# Patient Record
Sex: MALE | Race: ASIAN | NOT HISPANIC OR LATINO | URBAN - METROPOLITAN AREA
[De-identification: names, ages, dates, MRNs, and addresses within clinical notes are randomized per-mention and may not be internally consistent; named-entity substitution may affect disease eponyms.]

---

## 2017-10-26 ENCOUNTER — EMERGENCY (EMERGENCY)
Facility: HOSPITAL | Age: 47
LOS: 1 days | Discharge: HOME | End: 2017-10-26
Attending: EMERGENCY MEDICINE | Admitting: EMERGENCY MEDICINE
Payer: MEDICAID

## 2017-10-26 VITALS
OXYGEN SATURATION: 100 % | DIASTOLIC BLOOD PRESSURE: 95 MMHG | HEART RATE: 86 BPM | TEMPERATURE: 98 F | RESPIRATION RATE: 16 BRPM | SYSTOLIC BLOOD PRESSURE: 137 MMHG

## 2017-10-26 PROCEDURE — 99284 EMERGENCY DEPT VISIT MOD MDM: CPT | Mod: 25

## 2017-10-27 LAB
HIV1 AG SER QL: SIGNIFICANT CHANGE UP
HIV1+2 AB SPEC QL: SIGNIFICANT CHANGE UP

## 2017-10-27 RX ORDER — DIAZEPAM 5 MG
1 TABLET ORAL
Qty: 9 | Refills: 0 | OUTPATIENT
Start: 2017-10-27 | End: 2017-10-30

## 2017-10-27 RX ORDER — ACETAMINOPHEN 500 MG
650 TABLET ORAL ONCE
Qty: 0 | Refills: 0 | Status: COMPLETED | OUTPATIENT
Start: 2017-10-27 | End: 2017-10-27

## 2017-10-27 RX ADMIN — Medication 650 MILLIGRAM(S): at 00:56

## 2017-10-27 RX ADMIN — Medication 650 MILLIGRAM(S): at 02:00

## 2017-10-27 NOTE — ED ADULT NURSE REASSESSMENT NOTE - NS ED NURSE REASSESS COMMENT FT1
Pt reassessed, comfortable appearing, states improvement to pain and desire to go home.  MD Roldan @ bedside to review dc instructions and outpt follow up/ medications.  Stable at this time for exit.

## 2017-10-27 NOTE — ED PROVIDER NOTE - PLAN OF CARE
Pain relief Instructions for follow-up, activity and diet: Rest, drink plenty of fluids.  Advance activity as tolerated.  Continue all previously prescribed medications as directed.  Take Motrin 600 mg (three 200 mg over the counter pills) every 8 hours or prescription for 800mg every 8 hours as needed for moderate pain -- take with food..   For severe pain take Valium 5mg every 8 hours as needed.  Follow up with doctors as recommended - bring copies of your results.  Return to the ER for worsening or persistent symptoms, including but not limited to fevers, headaches, numbness, weakness, vision changes, double vision, worsening pain or inability to tolerate oral medications and/or ANY NEW OR CONCERNING SYMPTOMS. Spine specialist referral provided.

## 2017-10-27 NOTE — ED PROVIDER NOTE - OBJECTIVE STATEMENT
47M p/w back pain x 1 week. The pain is L lumbar, worse with movement, worse in the mornings. No trauma or heavy lifting. No fevers/chills, recent spinal procedures, bowel/bladder incontinence, hx of IVDU/cancer, recent weight loss, weakness/numbness/tingling, dysuria or hematuria. Pt taking ibuprofen 800mg and ribaxin without relief. PMD: Fanta Graff

## 2017-10-27 NOTE — ED PROVIDER NOTE - ATTENDING CONTRIBUTION TO CARE
JA DRAKE  ATTENDING, MD: 46 yo M presents with back pain 5-7 days, gradually worsening. The pain is lower LUMBAR and LEFT sided.  Non-radiating, no leg involvement, worse with movement, worse in the mornings.  No trauma, injury or heavy lifting. No F/C, h/o spinal procedures, BI/BI, weakness/numbness/tingling, or urinary complaints.. Pt taking Ibuprofen 800mg q8 and Robaxin 500mg q8 without relief. Pain is greater than 10 out of out.  Heat without relief.  Patient states that when he moves around for a while the pain improves slightly.    PMD: Fanat DRAKE, INO NOTE:  Patient is awake and alert and in no acute distress.  Normocephalic/atraumatic.  Auricles are normal.  Neck supple.  Lungs CTAB, no wheeze, no rhonchi,  no rales.  Heart is regular rate and rhythm.  Abdomen is soft, not distended +BS.  Back is nontender, no CVAT.  Mild LEFT lumbar muscle spasm.  Ambulatory without ataxia.  Moving all 4 extremities.   Neurologically grossly intact.  Affect is appropriate.   DR. DRAKE, ATTENDING MD-  I performed a face to face bedside interview with patient regarding history of present illness, review of symptoms and past medical history. I completed an independent physical exam.  I have discussed patient's plan of care with the resident.   I agree with note as stated above, having amended the EMR as needed to reflect my findings. I have discussed the assessment and plan of care.  This includes during the time I functioned as the attending physician for this patient.

## 2017-10-27 NOTE — ED ADULT NURSE NOTE - OBJECTIVE STATEMENT
Pt rcvd to 2 c/o L Lumbar pack pain, worse w/ movement and first AM. Denies trauma/falls/heavy lifting or strain.  No hx orthopedic/spinal issues or surgeries.  Denies paresthesias/numbness or weakness.  Pt aaox4, ambulatory, appearing calm/pleasant and comfortable. Standing/ambulating independently w/ steady gait.  Pt has been taking Ibuprofen & ribaxin w/o relief.  EDMD @ bedside for evaluation. Pt medicated per orders. Pt currently in no distress, family @ bedside.  Will reeval for +effect of medications and further plan of care.

## 2017-10-27 NOTE — ED PROVIDER NOTE - MEDICAL DECISION MAKING DETAILS
Back pain-muscle relaxant, analgesia PRN.  reassess. 47M with back pain, most consistent with lumbosacral strain or lumbar radiculopathy. No red flags for paraspinal/epidural abscess, fx, tumor, renal disease, or cauda equina. Plan: muscle relaxant, analgesia PRN.  reassess.

## 2017-10-27 NOTE — ED PROVIDER NOTE - CARE PLAN
Principal Discharge DX:	Lumbar strain  Goal:	Pain relief  Instructions for follow-up, activity and diet:	Instructions for follow-up, activity and diet: Rest, drink plenty of fluids.  Advance activity as tolerated.  Continue all previously prescribed medications as directed.  Take Motrin 600 mg (three 200 mg over the counter pills) every 8 hours or prescription for 800mg every 8 hours as needed for moderate pain -- take with food..   For severe pain take Valium 5mg every 8 hours as needed.  Follow up with doctors as recommended - bring copies of your results.  Return to the ER for worsening or persistent symptoms, including but not limited to fevers, headaches, numbness, weakness, vision changes, double vision, worsening pain or inability to tolerate oral medications and/or ANY NEW OR CONCERNING SYMPTOMS. Spine specialist referral provided.

## 2019-10-14 NOTE — ED ADULT TRIAGE NOTE - BP NONINVASIVE SYSTOLIC (MM HG)
137 RN 3S given report, pt. in no distress at this time, on cardiac monitor, will continue to monitor

## 2023-10-12 NOTE — ED PROVIDER NOTE - NS CPE EDP MUSC SPINE EXAM
1102 - rec'd return VM from patient who requested copy be sent to her by mail.  mg   no deformity, pain or tenderness. no restriction of movement